# Patient Record
Sex: MALE | Race: OTHER
[De-identification: names, ages, dates, MRNs, and addresses within clinical notes are randomized per-mention and may not be internally consistent; named-entity substitution may affect disease eponyms.]

---

## 2018-09-06 ENCOUNTER — HOSPITAL ENCOUNTER (EMERGENCY)
Dept: HOSPITAL 14 - H.ER | Age: 23
LOS: 1 days | Discharge: HOME | End: 2018-09-07
Payer: COMMERCIAL

## 2018-09-06 VITALS
OXYGEN SATURATION: 100 % | HEART RATE: 62 BPM | TEMPERATURE: 97.7 F | DIASTOLIC BLOOD PRESSURE: 66 MMHG | SYSTOLIC BLOOD PRESSURE: 107 MMHG | RESPIRATION RATE: 20 BRPM

## 2018-09-06 DIAGNOSIS — T88.7XXA: ICD-10-CM

## 2018-09-06 DIAGNOSIS — R10.9: ICD-10-CM

## 2018-09-06 DIAGNOSIS — R11.0: Primary | ICD-10-CM

## 2018-09-06 LAB
ALBUMIN SERPL-MCNC: 4.7 G/DL (ref 3.5–5)
ALBUMIN/GLOB SERPL: 1.6 {RATIO} (ref 1–2.1)
ALT SERPL-CCNC: 32 U/L (ref 21–72)
AST SERPL-CCNC: 24 U/L (ref 17–59)
BUN SERPL-MCNC: 26 MG/DL (ref 9–20)
CALCIUM SERPL-MCNC: 9.8 MG/DL (ref 8.4–10.2)
ERYTHROCYTE [DISTWIDTH] IN BLOOD BY AUTOMATED COUNT: 12.9 % (ref 11.5–14.5)
GFR NON-AFRICAN AMERICAN: > 60
HGB BLD-MCNC: 15.7 G/DL (ref 12–18)
MCH RBC QN AUTO: 29.2 PG (ref 27–31)
MCHC RBC AUTO-ENTMCNC: 34.7 G/DL (ref 33–37)
MCV RBC AUTO: 84.1 FL (ref 80–94)
PLATELET # BLD: 166 K/UL (ref 130–400)
RBC # BLD AUTO: 5.38 MIL/UL (ref 4.4–5.9)
WBC # BLD AUTO: 7.9 K/UL (ref 4.8–10.8)

## 2025-06-13 NOTE — ED PDOC
Encounter Date: 6/12/2025       History     Chief Complaint   Patient presents with    Nasal Congestion     Patient presents to emergency room with nasal congestion drainage runny nose with a rash runny nose last several days.  Patient has a chronic sinuses and been treated with a here nasal pharyngitis and sinus in the past.  Has been follow up by his primary care provider for the same complaint.  Has never been referred to ENT or had allergy testing.  There was no shortness breath, difficulty breathing, chest pain fever chills.  That has no headache or neurologic deficits.        Review of patient's allergies indicates:   Allergen Reactions    Augmentin [amoxicillin-pot clavulanate] Blisters    Ketchup     Orange juice      Past Medical History:   Diagnosis Date    Arthritis     Asthma     Depression     Hypertension     Unspecified hemorrhoids      No past surgical history on file.  Family History   Problem Relation Name Age of Onset    Hypertension Mother      Hyperlipidemia Mother      Heart disease Father      Diabetes Maternal Aunt      Breast cancer Paternal Grandmother       Social History[1]  Review of Systems   Constitutional:  Negative for fever.   HENT:  Negative for sore throat.    Respiratory:  Negative for shortness of breath.    Cardiovascular:  Negative for chest pain.   Gastrointestinal:  Negative for nausea.   Genitourinary:  Negative for dysuria.   Musculoskeletal:  Negative for back pain.   Skin:  Negative for rash.   Neurological:  Negative for weakness.   Hematological:  Does not bruise/bleed easily.       Physical Exam     Initial Vitals [06/12/25 2132]   BP Pulse Resp Temp SpO2   (!) 174/97 95 20 97.6 °F (36.4 °C) 98 %      MAP       --         Physical Exam    Nursing note and vitals reviewed.  Constitutional: He appears well-developed.   HENT:   Head: Normocephalic.   Right Ear: Tympanic membrane normal.   Left Ear: Tympanic membrane normal.   Nose: Mucosal edema present. Mouth/Throat:  HPI:Nausea, Vomiting, Diarrhea


Time Seen by Provider: 09/06/18 23:01


Chief Complaint (Nursing): Abdominal Pain


Chief Complaint (Provider): Nausea


History Per: Patient


Onset/Duration Of Symptoms: Hrs


Current Symptoms Are (Timing): Still Present


Additional Complaint(s): 





No PMHx presenting with nausea, fatigue, sweats after vomiting earlier.  States 

that around 730PM he ate a home-cooked burrito and consumed 3 tablets of "Alpha 

Brain" supplement.  He then felt light-headed, dizzy, weak, sweaty, and vomited 

3x, food-colored. No BM since the incident started.  No fevers.  No abdominal 

surgeries.  No other medications. Denies abdominal pain.





PMD: Dr. Rodríguez





Past Medical History


Reviewed: Historical Data, Nursing Documentation, Vital Signs


Vital Signs: 


 Last Vital Signs











Temp  97.7 F   09/06/18 22:56


 


Pulse  62   09/06/18 22:56


 


Resp  20   09/06/18 22:56


 


BP  107/66   09/06/18 22:56


 


Pulse Ox  100   09/06/18 22:56














- Medical History


PMH: No Chronic Diseases





- Surgical History


Surgical History: No Surg Hx





- Family History


Family History: States: Unknown Family Hx





- Allergies


Allergies/Adverse Reactions: 


 Allergies











Allergy/AdvReac Type Severity Reaction Status Date / Time


 


No Known Allergies Allergy   Verified 09/06/18 22:56














Review of Systems


ROS Statement: Except As Marked, All Systems Reviewed And Found Negative


Constitutional: Positive for: Chills, Sweats


Gastrointestinal: Positive for: Nausea, Vomiting.  Negative for: Abdominal Pain





Physical Exam





- Reviewed


Nursing Documentation Reviewed: Yes


Vital Signs Reviewed: Yes





- Physical Exam


Appears: Positive for: Well, Non-toxic, No Acute Distress


Head Exam: Positive for: ATRAUMATIC, NORMAL INSPECTION, NORMOCEPHALIC


Skin: Positive for: Normal Color, Warm, DRY


Eye Exam: Positive for: EOMI, Normal appearance, PERRL


ENT: Positive for: Normal ENT Inspection


Neck: Positive for: Normal, Painless ROM


Cardiovascular/Chest: Positive for: Regular Rate, Rhythm


Respiratory: Positive for: CNT, Normal Breath Sounds


Gastrointestinal/Abdominal: Positive for: Normal Exam, Soft.  Negative for: 

Tenderness


Back: Positive for: Normal Inspection


Extremity: Positive for: Normal ROM


Neurologic/Psych: Positive for: Alert, Oriented





- Laboratory Results


Result Diagrams: 


 09/06/18 23:38





 09/06/18 23:38





- ECG


O2 Sat by Pulse Oximetry: 100


Pulse Ox Interpretation: Normal





Medical Decision Making


Medical Decision Making: 





Well appearing 22y/o M presenting with nausea and vomiting after food and 

medication intake


-well appearing, normal vitals, normal exam


-possibly side effect of medication or mild GE


-will check basic labs, give zofran PO


-encouraged patient to stop using medication as it is indicated that there are 

side effects (See below)





From website of supplement:


Although, Alpha GPC is both safe and well tolerated in healthy adults,  side 

effects have been reported.





Users have occasionally experienced headaches, fatigue, nervousness, nausea, 

diarrhea and gastrointestinal distress; also, this supplement can result in 

dizziness and low blood pressure in some individuals.





If you know, you are prone to hypotension you should consult a medical 

professional before taking Alpha GPC.





Adverse side effects may occur more commonly among users taking high dosages.





0030


After medications patient felt much better.  Tolerated PO.  Advised cessation 

of supplements and advised followup with PMD.





Disposition





- Clinical Impression


Clinical Impression: 


 Medication side effect








- Disposition


Referrals: 


Richard Rodríguez MD [Primary Care Provider] - 


Disposition: Routine/Home


Disposition Time: 00:30


Condition: STABLE


Instructions:  Side Effects From Medicines


Forms:  [facility] (English) Uvula is midline, oropharynx is clear and moist and mucous membranes are normal.   Eyes: Pupils are equal, round, and reactive to light.   Neck:   Normal range of motion.  Cardiovascular:  Normal rate, regular rhythm, normal heart sounds and normal pulses.           Pulmonary/Chest: Effort normal and breath sounds normal.   Abdominal: Abdomen is soft and protuberant. Bowel sounds are normal.   Musculoskeletal:         General: Normal range of motion.      Cervical back: Normal range of motion.     Neurological: He is alert. He has normal strength and normal reflexes. No cranial nerve deficit. GCS eye subscore is 4. GCS verbal subscore is 5. GCS motor subscore is 6.   Skin: Skin is warm.         Medical Screening Exam   See Full Note    ED Course   Procedures  Labs Reviewed - No data to display       Imaging Results    None          Medications   dexAMETHasone injection 4 mg (has no administration in time range)   diphenhydrAMINE injection 25 mg (has no administration in time range)     Medical Decision Making  Patient with nasal congestion, runny nose sinuses most likely related to multiple allergies.    Amount and/or Complexity of Data Reviewed  Discussion of management or test interpretation with external provider(s): Patient is still with nasal congestion sinuses, will give Decadron shot, Benadryl injection, and referred to ENT for evaluation of chronic sinusitis with possible allergies.  Advised to follow back up with the clinic in the morning for further workup and assist with a referral.    Risk  Prescription drug management.                                      Clinical Impression:   Final diagnoses:  [R09.81] Nasal congestion (Primary)  [J32.9] Chronic sinusitis, unspecified location        ED Disposition Condition    Discharge Stable          ED Prescriptions    None       Follow-up Information       Follow up With Specialties Details Why Contact Info    Kristy Card, AYESHA, FNP-C [facility] In 1  day  [address] [facility]  [place] [id number]  [phone]                   [1]   Social History  Tobacco Use    Smoking status: Never    Smokeless tobacco: Never   Vaping Use    Vaping status: Never Used   Substance Use Topics    Alcohol use: Never    Drug use: Never        Carmelo Marte MD  06/12/25 2497